# Patient Record
Sex: MALE | URBAN - NONMETROPOLITAN AREA
[De-identification: names, ages, dates, MRNs, and addresses within clinical notes are randomized per-mention and may not be internally consistent; named-entity substitution may affect disease eponyms.]

---

## 2023-01-01 ENCOUNTER — APPOINTMENT (OUTPATIENT)
Dept: URBAN - NONMETROPOLITAN AREA CLINIC 56 | Age: 78
Setting detail: DERMATOLOGY
End: 2023-01-01

## 2023-01-01 DIAGNOSIS — I87.2 VENOUS INSUFFICIENCY (CHRONIC) (PERIPHERAL): ICD-10-CM

## 2023-01-01 DIAGNOSIS — B35.1 TINEA UNGUIUM: ICD-10-CM

## 2023-01-01 DIAGNOSIS — L23.89 ALLERGIC CONTACT DERMATITIS DUE TO OTHER AGENTS: ICD-10-CM

## 2023-01-01 DIAGNOSIS — B35.3 TINEA PEDIS: ICD-10-CM

## 2023-01-01 DIAGNOSIS — L08.9 LOCAL INFECTION OF THE SKIN AND SUBCUTANEOUS TISSUE, UNSPECIFIED: ICD-10-CM

## 2023-01-01 DIAGNOSIS — Z71.89 OTHER SPECIFIED COUNSELING: ICD-10-CM

## 2023-01-01 PROCEDURE — OTHER TREATMENT REGIMEN: OTHER

## 2023-01-01 PROCEDURE — OTHER MIPS QUALITY: OTHER

## 2023-01-01 PROCEDURE — 11102 TANGNTL BX SKIN SINGLE LES: CPT

## 2023-01-01 PROCEDURE — OTHER PRESCRIPTION: OTHER

## 2023-01-01 PROCEDURE — OTHER ORDER TESTS: OTHER

## 2023-01-01 PROCEDURE — OTHER PHOTO-DOCUMENTATION: OTHER

## 2023-01-01 PROCEDURE — OTHER COUNSELING: OTHER

## 2023-01-01 PROCEDURE — 99203 OFFICE O/P NEW LOW 30 MIN: CPT | Mod: 25

## 2023-01-01 PROCEDURE — OTHER BIOPSY BY SHAVE METHOD: OTHER

## 2023-01-01 PROCEDURE — OTHER PATIENT SPECIFIC COUNSELING: OTHER

## 2023-01-01 RX ORDER — KETOCONAZOLE 20 MG/ML
SHAMPOO, SUSPENSION TOPICAL
Qty: 120 | Refills: 11 | Status: ERX | COMMUNITY
Start: 2023-01-01

## 2023-01-01 RX ORDER — MUPIROCIN 20 MG/G
OINTMENT TOPICAL
Qty: 22 | Refills: 6 | Status: ERX | COMMUNITY
Start: 2023-01-01

## 2023-01-01 ASSESSMENT — LOCATION DETAILED DESCRIPTION DERM
LOCATION DETAILED: RIGHT DORSAL FOOT
LOCATION DETAILED: LEFT PROXIMAL PRETIBIAL REGION
LOCATION DETAILED: 2ND WEBSPACE LEFT FOOT
LOCATION DETAILED: LEFT DORSAL GREAT TOE
LOCATION DETAILED: LEFT DORSAL FOOT

## 2023-01-01 ASSESSMENT — LOCATION SIMPLE DESCRIPTION DERM
LOCATION SIMPLE: LEFT FOOT
LOCATION SIMPLE: LEFT PRETIBIAL REGION
LOCATION SIMPLE: RIGHT FOOT
LOCATION SIMPLE: LEFT GREAT TOE

## 2023-01-01 ASSESSMENT — LOCATION ZONE DERM
LOCATION ZONE: TOE
LOCATION ZONE: FEET
LOCATION ZONE: LEG

## 2023-06-22 PROBLEM — L08.9 LOCAL INFECTION OF THE SKIN AND SUBCUTANEOUS TISSUE, UNSPECIFIED: Status: ACTIVE | Noted: 2023-01-01

## 2023-06-22 PROBLEM — L23.89 ALLERGIC CONTACT DERMATITIS DUE TO OTHER AGENTS: Status: ACTIVE | Noted: 2023-01-01

## 2023-06-22 PROBLEM — I87.2 VENOUS INSUFFICIENCY (CHRONIC) (PERIPHERAL): Status: ACTIVE | Noted: 2023-01-01

## 2023-06-22 PROBLEM — Z71.89 OTHER SPECIFIED COUNSELING: Status: ACTIVE | Noted: 2023-01-01

## 2023-06-22 PROBLEM — B35.3 TINEA PEDIS: Status: ACTIVE | Noted: 2023-01-01

## 2023-06-22 PROBLEM — B35.1 TINEA UNGUIUM: Status: ACTIVE | Noted: 2023-01-01

## 2023-06-22 NOTE — PROCEDURE: PATIENT SPECIFIC COUNSELING
Will use ketoconazole shampoo.
Detail Level: Zone
Extreme erythema is seen bilaterally on both legs. Wife reports this intense redness, leakage of watery fluid, and edema have been present for years and are not acute. \\n\\nWe will biopsy and prove that this is a component of what is going on with the lower extremities.

## 2023-06-22 NOTE — PROCEDURE: TREATMENT REGIMEN
Plan: Did C&S will treat further based on result. \\npatient sees wound doctor Dr. Delgado every 3 weeks (she referred). They gave me verbal permission to correspond with Dr. Delgado.
Detail Level: Zone
Initiate Treatment: Clorox bath every other day, one capful of Clorox in full foot spa of warm water. Pat foot dry. Apply mupirocin. \\n\\nNext day one capful of ketoconazole shampoo in full foot spa of warm water. Pat food try. Apply mupirocin for now (will progress on to topical ketoconazole once I have infection under control.
Discontinue Regimen: Antibacterial soap - I believe this is beginning to irritate his skin so we will pause on this for now (wife was wiping down with antibacterial soap on legs then washing off with washcloth as patient unable to get in bath or shower. This is why we are beginning foot spa treatment. His foot has not seen water in years.

## 2023-06-22 NOTE — PROCEDURE: MIPS QUALITY
Quality 130: Documentation Of Current Medications In The Medical Record: Current Medications Documented
Quality 431: Preventive Care And Screening: Unhealthy Alcohol Use - Screening: Patient not identified as an unhealthy alcohol user when screened for unhealthy alcohol use using a systematic screening method
Quality 111:Pneumonia Vaccination Status For Older Adults: Patient received any pneumococcal conjugate or polysaccharide vaccine on or after their 60th birthday and before the end of the measurement period
Detail Level: Detailed
Quality 110: Preventive Care And Screening: Influenza Immunization: Influenza Immunization Administered during Influenza season
Quality 226: Preventive Care And Screening: Tobacco Use: Screening And Cessation Intervention: Patient screened for tobacco use and is an ex/non-smoker

## 2024-12-12 NOTE — PROCEDURE: COUNSELING
Detail Level: Generalized
Detail Level: Detailed
[None] : none
[Mass ___ cm] : no masses on the base of the tongue
[Normal] : normal
[Lesion(s)] : no lesions